# Patient Record
Sex: MALE | Race: WHITE | Employment: PART TIME | URBAN - METROPOLITAN AREA
[De-identification: names, ages, dates, MRNs, and addresses within clinical notes are randomized per-mention and may not be internally consistent; named-entity substitution may affect disease eponyms.]

---

## 2021-09-29 ENCOUNTER — OFFICE VISIT (OUTPATIENT)
Dept: ORTHOPEDIC SURGERY | Age: 71
End: 2021-09-29
Payer: OTHER GOVERNMENT

## 2021-09-29 VITALS — WEIGHT: 180 LBS | HEIGHT: 65 IN | BODY MASS INDEX: 29.99 KG/M2

## 2021-09-29 DIAGNOSIS — S46.211A STRAIN OF RIGHT BICEPS, INITIAL ENCOUNTER: Primary | ICD-10-CM

## 2021-09-29 PROCEDURE — 99203 OFFICE O/P NEW LOW 30 MIN: CPT | Performed by: ORTHOPAEDIC SURGERY

## 2021-09-29 RX ORDER — FINASTERIDE 5 MG/1
TABLET, FILM COATED ORAL
COMMUNITY
Start: 2021-03-15

## 2021-09-29 RX ORDER — SILDENAFIL 100 MG/1
TABLET, FILM COATED ORAL
COMMUNITY
Start: 2021-07-23

## 2021-09-29 RX ORDER — IBUPROFEN 600 MG/1
TABLET ORAL
COMMUNITY
Start: 2021-06-11

## 2021-09-29 NOTE — PROGRESS NOTES
New Shoulder Patient Visit     Referring Provider:   Chon Serrano MD  [address] [address] [address] [place] [address]    CHIEF COMPLAINT:   Chief Complaint   Patient presents with    Shoulder Pain     Rt humerus pain / mass x 2-3 months, states fell and tried to catch himself; denies limited ROM or strength; mass present    Results     [facility]    Other     h/o Rt RC cuff 2012        HPI:      Willye Gilford is a 70y.o. year old male who is seen today  for evaluation of right shoulder pain. He reports the pain has been ongoing for the past 4 months. He does recall a specific injury which started the pain. Patient states that about 4 months ago he tripped and fell catching himself injuring his right arm. He noticed a palpable lump to his right upper arm following injury. He denies pain or weakness to the right upper extremity. He had a recent MRI of his right shoulder and humerus. The patient does not have mechanical symptoms. He does not have night pain. He denies a feeling of instability. The prior treatments have been none. The patient is right hand dominant. The patient is working part time. The patients occupation is  at [employer] . PAST MEDICAL HISTORY  Past Medical History:   Diagnosis Date    Enlarged prostate        PAST SURGICAL HISTORY  Past Surgical History:   Procedure Laterality Date    ROTATOR CUFF REPAIR Right     TONSILLECTOMY AND ADENOIDECTOMY         FAMILY HISTORY   No family history on file. SOCIAL HISTORY  Social History     Occupational History    Not on file   Tobacco Use    Smoking status: Former Smoker     Packs/day: 0.50     Quit date: 2021     Years since quittin.5    Smokeless tobacco: Never Used   Substance and Sexual Activity    Alcohol use:  Yes    Drug use: Yes     Types: Marijuana    Sexual activity: Not on file       CURRENT MEDICATIONS     Current Outpatient Medications:     ibuprofen (ADVIL;MOTRIN) 600 MG tablet, TAKE ONE TABLET BY MOUTH TWICE A DAY AS NEEDED (WITH FOOD) FOR PAIN, Disp: , Rfl:     Cholecalciferol 50 MCG (2000 UT) TABS, TAKE ONE TABLET BY MOUTH EVERY DAY, Disp: , Rfl:     finasteride (PROSCAR) 5 MG tablet, TAKE ONE TABLET BY MOUTH EVERY DAY (DO NOT CRUSH), Disp: , Rfl:     tamsulosin (FLOMAX) 0.4 MG capsule, Take 0.4 mg by mouth daily. , Disp: , Rfl:     sildenafil (VIAGRA) 100 MG tablet, TAKE ONE-HALF TABLET BY MOUTH AN HOUR_BEFORE SEX. (NO MORE THAN 1 DOSE PER 24 HOURS) NO NITRATES, Disp: , Rfl:     FLUoxetine HCl (PROZAC PO), Take  by mouth Daily. (Patient not taking: Reported on 9/29/2021), Disp: , Rfl:     ALLERGIES  No Known Allergies    Controlled Substances Monitoring:        REVIEW OF SYSTEMS:     Constitutional:  Negative for weight loss, fevers, chills, fatigue  Cardiovascular: Negative for chest pain, palpitations  Pulmonary: Negative for shortness of breath, labored breathing, cough  GI: negative for abdominal pain, nausea, vomitting   MSK: per HPI  Skin: negative for rash, open wounds    All other systems reviewed and are negative           PHYSICAL EXAM     Vitals:    09/29/21 0917   Weight: 180 lb (81.6 kg)   Height: 5' 5\" (1.651 m)       Height: 5' 5\" (1.651 m)  Weight: 180 lb per pt  BMI:  Body mass index is 29.95 kg/m². General: The patient is alert and oriented x 3, appears to be stated age and in no distress. HEENT: head is normocephalic, atraumatic. EOMI. Neck: supple, trachea midline, no thyromegaly   Cardiovascular: peripheral pulses palpable. Normal Capillary refill   Respiratory: breathing unlabored, chest expansion symmetric   Skin: no rash, no open wounds, no erythema  Psych: normal affect; mood stable  Neurologic: gait normal, sensation grossly intact in extremities  MSK:    Cervical Spine: There is no tenderness to palpation along the cervical spine.   Range of motion is normal.  Spurling's is negative    Shoulder Exam:   Right shoulder exam range of motion is intact, no weakness present on exam.  No pain present on exam.  Palpable soft mobile well-circumscribed nodule present. Hook exam intact, speeds exam intact, vigorous and exam intact no weakness present on rotator cuff exams. IMAGING:     No new imaging obtained today. Patient brought in disc from the [place] with MRI of the right shoulder. MRI was reviewed showing fluid collection over biceps muscle belly. Distal and proximal biceps tendons are intact. History of rotator cuff surgery visualized with bone anchors present to the humeral head. Radiographic findings reviewed with patient    ASSESSMENT   Right biceps cyst      PLAN  Today we discussed his right shoulder. Patient reported onset of palpable mass to his right upper arm about 4 months ago following a fall. He does have a reported history of rotator cuff surgery to the right shoulder. On exam he has no pain or weakness present. Biceps tendon was intact on exam.  MRI was reviewed showing cystic changes to the biceps muscle this was tracked along the bicipital sheath likely a result of his recent injury. Discussed conservative treatment at this time we will continue to monitor. He can resume all activities without restrictions. He will follow-up as needed. Patient verbalized understanding. Jarret Joyce [facility]  Orthopedic Surgery   09/29/21  10:59 AM      Staff Addendum    I have seen and evaluated the patient and agree with the assessment and plan as documented by Guerline Srivastava CNP. I have performed the key components of the history and physical examination and concur with the findings and plan, and have made changes where appropriate/necessary. Agree with above. I suspect he had some partial tearing of his biceps at the initial injury, now has a cystic fluid collection over the biceps which is well visualized on MRI. It is benign in appearance.   Fluid can be tracked proximally all the way up to the biceps proximally which is sitting somewhat subluxed out of the groove. On exam, he is having no pain, this area is mobile and soft. He has good strength and full range of motion of the shoulder with no deficits on exam today    We will continue to observe. If he develops pain or is having issues with function in his right upper extremity he follow-up for reassessment.       Albaro Bhandari MD  [address]

## 2022-02-10 ENCOUNTER — HOSPITAL ENCOUNTER (OUTPATIENT)
Dept: CT IMAGING | Age: 72
Discharge: HOME | End: 2022-02-12
Payer: OTHER GOVERNMENT

## 2022-02-10 DIAGNOSIS — J43.8 OTHER EMPHYSEMA (HCC): ICD-10-CM

## 2022-02-10 PROCEDURE — 71250 CT THORAX DX C-: CPT

## 2024-12-04 ENCOUNTER — HOSPITAL ENCOUNTER (OUTPATIENT)
Dept: ULTRASOUND IMAGING | Age: 74
Discharge: HOME | End: 2024-12-06

## 2024-12-04 DIAGNOSIS — N28.1 RENAL CYST: ICD-10-CM

## 2024-12-04 DIAGNOSIS — N28.1 CYST OF KIDNEY, ACQUIRED: ICD-10-CM

## 2025-03-05 ENCOUNTER — APPOINTMENT (OUTPATIENT)
Dept: CT IMAGING | Age: 75
End: 2025-03-05
Payer: OTHER GOVERNMENT

## 2025-03-05 ENCOUNTER — HOSPITAL ENCOUNTER (EMERGENCY)
Age: 75
Discharge: HOME | End: 2025-03-05
Attending: EMERGENCY MEDICINE
Payer: OTHER GOVERNMENT

## 2025-03-05 VITALS
RESPIRATION RATE: 16 BRPM | TEMPERATURE: 98.1 F | HEART RATE: 63 BPM | DIASTOLIC BLOOD PRESSURE: 91 MMHG | SYSTOLIC BLOOD PRESSURE: 160 MMHG | OXYGEN SATURATION: 96 %
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
## 2025-03-05 DIAGNOSIS — S09.90XA INJURY OF HEAD, INITIAL ENCOUNTER: ICD-10-CM

## 2025-03-05 DIAGNOSIS — S09.90XA CLOSED HEAD INJURY, INITIAL ENCOUNTER: ICD-10-CM

## 2025-03-05 PROCEDURE — 70450 CT HEAD/BRAIN W/O DYE: CPT

## 2025-03-05 PROCEDURE — 72125 CT NECK SPINE W/O DYE: CPT

## 2025-03-05 PROCEDURE — 70486 CT MAXILLOFACIAL W/O DYE: CPT

## 2025-03-05 PROCEDURE — 99284 EMERGENCY DEPT VISIT MOD MDM: CPT

## 2025-04-24 ENCOUNTER — TELEPHONE (OUTPATIENT)
Dept: ORTHOPEDIC SURGERY | Age: 75
End: 2025-04-24

## 2025-04-24 NOTE — TELEPHONE ENCOUNTER
Referral received for patient via external source.     Referral reason/diagnosis: osteoarthritis of knee     Routed to providers for recommendations.    No future appointments.    Electronically signed by Roslyn Duarte on 4/24/2025 at 3:08 PM

## 2025-04-25 NOTE — TELEPHONE ENCOUNTER
Call placed to patient at this time to schedule appointment per provider recommendations. No answer. Left message to call office back to schedule routine appointment with [facility] in [place].    No future appointments.    Electronically signed by Padma Ma ATC on 4/25/2025 at 12:07 PM

## 2025-04-28 NOTE — TELEPHONE ENCOUNTER
Future Appointments   Date Time Provider Department Center   5/13/2025 11:00 AM [facility]

## 2025-05-13 ENCOUNTER — OFFICE VISIT (OUTPATIENT)
Dept: ORTHOPEDIC SURGERY | Age: 75
End: 2025-05-13

## 2025-05-13 VITALS
SYSTOLIC BLOOD PRESSURE: 128 MMHG | HEART RATE: 80 BPM | TEMPERATURE: 98 F | OXYGEN SATURATION: 94 % | DIASTOLIC BLOOD PRESSURE: 74 MMHG

## 2025-05-13 DIAGNOSIS — M25.562 PAIN IN BOTH KNEES, UNSPECIFIED CHRONICITY: Primary | ICD-10-CM

## 2025-05-13 DIAGNOSIS — M25.561 PAIN IN BOTH KNEES, UNSPECIFIED CHRONICITY: Primary | ICD-10-CM

## 2025-05-14 RX ORDER — TRIAMCINOLONE ACETONIDE 40 MG/ML
80 INJECTION, SUSPENSION INTRA-ARTICULAR; INTRAMUSCULAR ONCE
Status: SHIPPED | OUTPATIENT
Start: 2025-05-14

## 2025-05-14 RX ORDER — BUPIVACAINE HYDROCHLORIDE 2.5 MG/ML
3 INJECTION, SOLUTION EPIDURAL; INFILTRATION; INTRACAUDAL; PERINEURAL ONCE
Status: SHIPPED | OUTPATIENT
Start: 2025-05-14

## 2025-05-14 NOTE — PROGRESS NOTES
Chief Complaint   Patient presents with    Knee Pain     Bilateral knee pain x two years. L>R       SUBJECTIVE: Patient is a very pleasant 74 male comes in today with bilateral knee pain left greater than right.  He states that the knees have been hurting for approximately 2 years.  He has taken over-the-counter ibuprofen which is helpful.  At rest he states his pain is 3/10.  When he is active he can get up as high as 8/10.  He is very active.  He is accompanied by his wife in the office today.  He denies any injury states it has been a slow onset and getting worse over time.          Past Medical History:   Diagnosis Date    Enlarged prostate        Past Surgical History:   Procedure Laterality Date    ROTATOR CUFF REPAIR Right     TONSILLECTOMY AND ADENOIDECTOMY         No family history on file.    Social History     Tobacco Use    Smoking status: Former     Current packs/day: 0.00     Types: Cigarettes     Quit date: 2021     Years since quittin.2    Smokeless tobacco: Never   Substance Use Topics    Alcohol use: Yes    Drug use: Yes     Types: Marijuana (Weed)       No Known Allergies      Review of Systems   Constitutional: Negative for fever, chills, diaphoresis, appetite change and fatigue.   HENT: Negative for dental issues, hearing loss and tinnitus. Negative for congestion, sinus pressure, sneezing, sore throat. Negative for headache.  Eyes: Negative for visual disturbance, blurred and double vision. Negative for pain, discharge, redness and itching  Respiratory: Negative for cough, shortness of breath and wheezing.   Cardiovascular: Negative for chest pain, palpitations and leg swelling. No dyspnea on exertion   Gastrointestinal:   Negative for nausea, vomiting, abdominal pain, diarrhea, constipation  or black or bloody.  Hematologic\Lymphatic:  negative for bleeding, petechiae,   Genitourinary: Negative for hematuria and difficulty urinating.   Musculoskeletal: Negative for neck pain and